# Patient Record
Sex: FEMALE | URBAN - METROPOLITAN AREA
[De-identification: names, ages, dates, MRNs, and addresses within clinical notes are randomized per-mention and may not be internally consistent; named-entity substitution may affect disease eponyms.]

---

## 2023-05-22 ENCOUNTER — APPOINTMENT (RX ONLY)
Dept: URBAN - METROPOLITAN AREA CLINIC 340 | Facility: CLINIC | Age: 73
Setting detail: DERMATOLOGY
End: 2023-05-22

## 2023-05-22 PROBLEM — D03.39 MELANOMA IN SITU OF OTHER PARTS OF FACE: Status: ACTIVE | Noted: 2023-05-22

## 2023-05-22 PROCEDURE — ? OTHER

## 2023-05-22 PROCEDURE — ? CONSULTATION FOR STAGED EXCISION WITH PERMANENT SECTIONS

## 2023-05-22 PROCEDURE — ? COUNSELING

## 2023-05-22 PROCEDURE — ? DEFER

## 2023-05-22 PROCEDURE — 99203 OFFICE O/P NEW LOW 30 MIN: CPT

## 2023-05-22 NOTE — PROCEDURE: DEFER
Other Procedure: slow Mohs
X Size Of Lesion In Cm (Optional): 0
Detail Level: Detailed
Procedure To Be Performed At Next Visit: Mohs surgery
Introduction Text (Please End With A Colon): The following procedure was deferred:

## 2023-05-22 NOTE — PROCEDURE: OTHER
Other (Free Text): No artificial heart valve, or joint replacements. Not a smoker.
Render Risk Assessment In Note?: no
Detail Level: Detailed
Note Text (......Xxx Chief Complaint.): This diagnosis correlates with the

## 2023-06-05 ENCOUNTER — APPOINTMENT (RX ONLY)
Dept: URBAN - METROPOLITAN AREA CLINIC 340 | Facility: CLINIC | Age: 73
Setting detail: DERMATOLOGY
End: 2023-06-05

## 2023-06-05 PROBLEM — D03.39 MELANOMA IN SITU OF OTHER PARTS OF FACE: Status: ACTIVE | Noted: 2023-06-05

## 2023-06-05 PROCEDURE — 11642 EXC F/E/E/N/L MAL+MRG 1.1-2: CPT

## 2023-06-05 PROCEDURE — ? STAGED EXCISION WITH PERMANENT SECTION

## 2023-06-05 NOTE — PROCEDURE: STAGED EXCISION WITH PERMANENT SECTION
Consent/Introductory Paragraph: The rationale for staged excision was explained to the patient and consent was obtained. The risks, benefits and alternatives to therapy were discussed in detail. Specifically, the risks of infection, scarring, bleeding, prolonged wound healing, incomplete removal, allergy to anesthesia, nerve injury and recurrence were addressed. Prior to the procedure, the treatment site was clearly identified and confirmed by the patient.

## 2023-06-05 NOTE — PROCEDURE: STAGED EXCISION WITH PERMANENT SECTION
Previous Accession (Optional): [facility] [id number] Previous Accession (Optional): [facility] [id number]

## 2023-06-05 NOTE — PROCEDURE: STAGED EXCISION WITH PERMANENT SECTION
Staining Text: Specimen #1 was submitted to assess for residual tumor and invasion Specimens #2,3,4, 5 were submitted for en face epidermal sectioning.  Medial edge was stained blue, lateral edge for full en face sectioning was unstained.

## 2023-06-07 ENCOUNTER — APPOINTMENT (RX ONLY)
Dept: URBAN - METROPOLITAN AREA CLINIC 340 | Facility: CLINIC | Age: 73
Setting detail: DERMATOLOGY
End: 2023-06-07

## 2023-06-07 PROBLEM — D03.39 MELANOMA IN SITU OF OTHER PARTS OF FACE: Status: ACTIVE | Noted: 2023-06-07

## 2023-06-07 PROCEDURE — ? REPAIR NOTE

## 2023-06-07 PROCEDURE — 12052 INTMD RPR FACE/MM 2.6-5.0 CM: CPT | Mod: 79

## 2023-06-07 NOTE — PROCEDURE: REPAIR NOTE
yellow fruits and vegetables are especially good for you. Examples include spinach, carrots, peaches, and berries.     · Foods high in fiber can reduce your cholesterol and provide important vitamins and minerals. High-fiber foods include whole-grain cereals and breads, oatmeal, beans, brown rice, citrus fruits, and apples.     · Limit drinks and foods with added sugar. These include candy, desserts, and soda pop.    Lifestyle changes    · If your doctor recommends it, get more exercise. Walking is a good choice. Bit by bit, increase the amount you walk every day. Try for at least 30 minutes on most days of the week. You also may want to swim, bike, or do other activities.     · Do not smoke. If you need help quitting, talk to your doctor about stop-smoking programs and medicines. These can increase your chances of quitting for good. Quitting smoking may be the most important step you can take to protect your heart. It is never too late to quit. You will get health benefits right away.     · Limit alcohol to 2 drinks a day for men and 1 drink a day for women. Too much alcohol can cause health problems. Medicines    · Take your medicines exactly as prescribed. Call your doctor if you think you are having a problem with your medicine.     · If your doctor recommends aspirin, take the amount directed each day. Make sure you take aspirin and not another kind of pain reliever, such as acetaminophen (Tylenol). If you take ibuprofen (such as Advil or Motrin) for other problems, take aspirin at least 2 hours before taking ibuprofen. When should you call for help? Call 911 if you have symptoms of a heart attack.  These may include:    · Chest pain or pressure, or a strange feeling in the chest.     · Sweating.     · Shortness of breath.     · Pain, pressure, or a strange feeling in the back, neck, jaw, or upper belly or in one or both shoulders or arms.     · Lightheadedness or sudden weakness.     · A fast or irregular Interpolation Flap Text: A decision was made to reconstruct the defect utilizing an interpolation axial flap and a staged reconstruction.  A telfa template was made of the defect.  This telfa template was then used to outline the interpolation flap.  The donor area for the pedicle flap was then injected with anesthesia.  The flap was excised through the skin and subcutaneous tissue down to the layer of the underlying musculature.  The interpolation flap was carefully excised within this deep plane to maintain its blood supply.  The edges of the donor site were undermined.   The donor site was closed in a primary fashion.  The pedicle was then rotated into position and sutured.  Once the tube was sutured into place, adequate blood supply was confirmed with blanching and refill.  The pedicle was then wrapped with xeroform gauze and dressed appropriately with a telfa and gauze bandage to ensure continued blood supply and protect the attached pedicle.

## 2023-06-07 NOTE — PROCEDURE: REPAIR NOTE
Consent: The rationale for the repair was explained to the patient and consent was obtained. The risks, benefits and alternatives to therapy were discussed in detail. Specifically, the risks of infection, scarring, bleeding, prolonged wound healing,, allergy to anesthesia, nerve injury were addressed. Prior to the procedure, the treatment site was clearly identified and confirmed by the patient.

## 2023-06-07 NOTE — PROCEDURE: REPAIR NOTE
Graft Donor Site Bandage (Optional-Leave Blank If You Don't Want In Note): Aquaphor and pressure bandage

## 2023-06-07 NOTE — PROCEDURE: REPAIR NOTE
O-Z Flap Text: The defect edges were debeveled with a #15 scalpel blade.  Given the location of the defect, shape of the defect and the proximity to free margins an O-Z flap was deemed most appropriate.  Using a sterile surgical marker, an appropriate transposition flap was drawn incorporating the defect and placing the expected incisions within the relaxed skin tension lines where possible. The area thus outlined was incised deep to adipose tissue with a #15 scalpel blade.  The skin margins were undermined to an appropriate distance in all directions utilizing iris scissors. Other

## 2023-06-09 ENCOUNTER — APPOINTMENT (RX ONLY)
Dept: URBAN - METROPOLITAN AREA CLINIC 340 | Facility: CLINIC | Age: 73
Setting detail: DERMATOLOGY
End: 2023-06-09

## 2023-06-09 ENCOUNTER — APPOINTMENT (RX ONLY)
Dept: URBAN - METROPOLITAN AREA CLINIC 336 | Facility: CLINIC | Age: 73
Setting detail: DERMATOLOGY
End: 2023-06-09

## 2023-06-09 DIAGNOSIS — Z48.817 ENCOUNTER FOR SURGICAL AFTERCARE FOLLOWING SURGERY ON THE SKIN AND SUBCUTANEOUS TISSUE: ICD-10-CM

## 2023-06-09 PROCEDURE — ? POST-OP WOUND CHECK

## 2023-06-09 ASSESSMENT — LOCATION SIMPLE DESCRIPTION DERM: LOCATION SIMPLE: LEFT ZYGOMA

## 2023-06-09 ASSESSMENT — LOCATION ZONE DERM: LOCATION ZONE: FACE

## 2023-06-09 ASSESSMENT — LOCATION DETAILED DESCRIPTION DERM: LOCATION DETAILED: LEFT LATERAL ZYGOMA

## 2023-06-09 NOTE — PROCEDURE: POST-OP WOUND CHECK
Detail Level: Detailed
Add 75757 Cpt? (Important Note: In 2017 The Use Of 06682 Is Being Tracked By Cms To Determine Future Global Period Reimbursement For Global Periods): no
Wound Evaluated By: BF/LB

## 2023-06-14 ENCOUNTER — APPOINTMENT (RX ONLY)
Dept: URBAN - METROPOLITAN AREA CLINIC 340 | Facility: CLINIC | Age: 73
Setting detail: DERMATOLOGY
End: 2023-06-14

## 2023-06-14 DIAGNOSIS — Z48.817 ENCOUNTER FOR SURGICAL AFTERCARE FOLLOWING SURGERY ON THE SKIN AND SUBCUTANEOUS TISSUE: ICD-10-CM

## 2023-06-14 PROCEDURE — 99024 POSTOP FOLLOW-UP VISIT: CPT

## 2023-06-14 PROCEDURE — ? POST-OP WOUND CHECK

## 2023-06-14 ASSESSMENT — LOCATION DETAILED DESCRIPTION DERM: LOCATION DETAILED: LEFT CENTRAL ZYGOMA

## 2023-06-14 ASSESSMENT — LOCATION ZONE DERM: LOCATION ZONE: FACE

## 2023-06-14 ASSESSMENT — LOCATION SIMPLE DESCRIPTION DERM: LOCATION SIMPLE: LEFT ZYGOMA

## 2023-06-14 NOTE — PROCEDURE: POST-OP WOUND CHECK
Detail Level: Detailed
Add 08483 Cpt? (Important Note: In 2017 The Use Of 31181 Is Being Tracked By Cms To Determine Future Global Period Reimbursement For Global Periods): yes
Wound Evaluated By: ANTHONY/LARRY
Additional Comments: Pt counseled to continue ice and that it takes time for eyelid edema to settle down.   Skin medica scar gel 2x daily. Follow w/ SPF in the morning. Massage into area at night.

## 2023-06-21 ENCOUNTER — APPOINTMENT (RX ONLY)
Dept: URBAN - METROPOLITAN AREA CLINIC 340 | Facility: CLINIC | Age: 73
Setting detail: DERMATOLOGY
End: 2023-06-21

## 2023-06-21 DIAGNOSIS — Z48.817 ENCOUNTER FOR SURGICAL AFTERCARE FOLLOWING SURGERY ON THE SKIN AND SUBCUTANEOUS TISSUE: ICD-10-CM

## 2023-06-21 PROCEDURE — ? POST-OP WOUND CHECK (NO GLOBAL PERIOD)

## 2023-06-21 ASSESSMENT — LOCATION SIMPLE DESCRIPTION DERM: LOCATION SIMPLE: LEFT ZYGOMA

## 2023-06-21 ASSESSMENT — LOCATION DETAILED DESCRIPTION DERM: LOCATION DETAILED: LEFT LATERAL ZYGOMA

## 2023-06-21 ASSESSMENT — LOCATION ZONE DERM: LOCATION ZONE: FACE

## 2023-06-21 NOTE — PROCEDURE: POST-OP WOUND CHECK (NO GLOBAL PERIOD)
Detail Level: Detailed
Wound Evaluated By: JAIMIE/LARRY
Additional Comments: Pt is 2 weeks post op - healing well with good cosmesis for time frame.

## 2023-06-28 ENCOUNTER — APPOINTMENT (RX ONLY)
Dept: URBAN - METROPOLITAN AREA CLINIC 340 | Facility: CLINIC | Age: 73
Setting detail: DERMATOLOGY
End: 2023-06-28

## 2023-06-28 DIAGNOSIS — Z48.817 ENCOUNTER FOR SURGICAL AFTERCARE FOLLOWING SURGERY ON THE SKIN AND SUBCUTANEOUS TISSUE: ICD-10-CM | Status: STABLE

## 2023-06-28 PROCEDURE — ? POST-OP WOUND CHECK (NO GLOBAL PERIOD)

## 2023-06-28 ASSESSMENT — LOCATION ZONE DERM: LOCATION ZONE: FACE

## 2023-06-28 ASSESSMENT — LOCATION SIMPLE DESCRIPTION DERM: LOCATION SIMPLE: LEFT ZYGOMA

## 2023-06-28 ASSESSMENT — LOCATION DETAILED DESCRIPTION DERM: LOCATION DETAILED: LEFT LATERAL ZYGOMA

## 2023-06-28 NOTE — PROCEDURE: POST-OP WOUND CHECK (NO GLOBAL PERIOD)
Detail Level: Detailed
Wound Evaluated By: /LARRY
Additional Comments: Pt is 3 weeks post op - healing well.  Patient was extensively counseled and physically shown how to correctly massage the scar with scar gel by LARRY. Patient was also counseled on realistic expectations, and  scar healing time being 6-12 months.patient will be closely followed every 2 weeks for wound checks.

## 2023-07-12 ENCOUNTER — APPOINTMENT (RX ONLY)
Dept: URBAN - METROPOLITAN AREA CLINIC 340 | Facility: CLINIC | Age: 73
Setting detail: DERMATOLOGY
End: 2023-07-12

## 2023-07-12 DIAGNOSIS — Z48.817 ENCOUNTER FOR SURGICAL AFTERCARE FOLLOWING SURGERY ON THE SKIN AND SUBCUTANEOUS TISSUE: ICD-10-CM

## 2023-07-12 PROCEDURE — ? POST-OP WOUND CHECK (NO GLOBAL PERIOD)

## 2023-07-12 ASSESSMENT — LOCATION ZONE DERM: LOCATION ZONE: FACE

## 2023-07-12 ASSESSMENT — LOCATION SIMPLE DESCRIPTION DERM: LOCATION SIMPLE: LEFT ZYGOMA

## 2023-07-12 ASSESSMENT — LOCATION DETAILED DESCRIPTION DERM: LOCATION DETAILED: LEFT CENTRAL ZYGOMA

## 2023-07-12 NOTE — PROCEDURE: POST-OP WOUND CHECK (NO GLOBAL PERIOD)
Detail Level: Detailed
Wound Evaluated By: ANTHONY/LARRY
Additional Comments: Advised Pt to mix La Roche Posay moisturizer with the scar gel and massage at night for 5 minutes.  Progressing well for time frame of surgery.  advised pt again of scar progression.

## 2023-08-09 ENCOUNTER — APPOINTMENT (RX ONLY)
Dept: URBAN - METROPOLITAN AREA CLINIC 340 | Facility: CLINIC | Age: 73
Setting detail: DERMATOLOGY
End: 2023-08-09

## 2023-08-09 DIAGNOSIS — Z48.817 ENCOUNTER FOR SURGICAL AFTERCARE FOLLOWING SURGERY ON THE SKIN AND SUBCUTANEOUS TISSUE: ICD-10-CM

## 2023-08-09 PROCEDURE — ? POST-OP WOUND CHECK (NO GLOBAL PERIOD)

## 2023-08-09 PROCEDURE — ? PHOTO-DOCUMENTATION

## 2023-08-09 ASSESSMENT — LOCATION ZONE DERM: LOCATION ZONE: FACE

## 2023-08-09 ASSESSMENT — LOCATION DETAILED DESCRIPTION DERM: LOCATION DETAILED: LEFT LATERAL ZYGOMA

## 2023-08-09 ASSESSMENT — LOCATION SIMPLE DESCRIPTION DERM: LOCATION SIMPLE: LEFT ZYGOMA

## 2023-08-09 NOTE — PROCEDURE: POST-OP WOUND CHECK (NO GLOBAL PERIOD)
Detail Level: Detailed
Wound Evaluated By: VIET/LARRY
Additional Comments: Improving well given time frame of surgery.  Pt may start with vitamin E gel if preferred.  Pt concerned with depression at end of scar line - discussed we have to give the area time, continue massage and could consider punch removal of the small depressed area if it remains after 1 year. PT DUE FOR FBE IN SEPT

## 2023-09-11 ENCOUNTER — APPOINTMENT (RX ONLY)
Dept: URBAN - METROPOLITAN AREA CLINIC 340 | Facility: CLINIC | Age: 73
Setting detail: DERMATOLOGY
End: 2023-09-11

## 2023-09-11 DIAGNOSIS — Z48.817 ENCOUNTER FOR SURGICAL AFTERCARE FOLLOWING SURGERY ON THE SKIN AND SUBCUTANEOUS TISSUE: ICD-10-CM

## 2023-09-11 PROCEDURE — ? PHOTO-DOCUMENTATION

## 2023-09-11 PROCEDURE — ? POST-OP WOUND CHECK (NO GLOBAL PERIOD)

## 2023-09-11 ASSESSMENT — LOCATION SIMPLE DESCRIPTION DERM: LOCATION SIMPLE: LEFT ZYGOMA

## 2023-09-11 ASSESSMENT — LOCATION DETAILED DESCRIPTION DERM: LOCATION DETAILED: LEFT CENTRAL ZYGOMA

## 2023-09-11 ASSESSMENT — LOCATION ZONE DERM: LOCATION ZONE: FACE

## 2023-09-11 NOTE — PROCEDURE: POST-OP WOUND CHECK (NO GLOBAL PERIOD)
Detail Level: Detailed
Wound Evaluated By: LARRY
Additional Comments: 5 minute daily firm massage. \\nPt may use a regular moisturizer. Okay to use vitamin E oil.  Sun protection reviewed.\\nPt states when she smiles broadly there is pull on the scar L side.  Pt states I said there could be cosmetic correction of this - I reminded pt that we had not discussed that last visit.  We had discussed depression at inferior end of scar which has improved could be addressed cosmetically with punch out of depression.  Advised as scar softens the pull she sees with extreme smile will improve, but may never be fully \"normal\" due to scar from procedure.  Advised there is no cosmetic intervention for this.

## 2023-11-06 ENCOUNTER — APPOINTMENT (RX ONLY)
Dept: URBAN - METROPOLITAN AREA CLINIC 340 | Facility: CLINIC | Age: 73
Setting detail: DERMATOLOGY
End: 2023-11-06

## 2023-11-06 DIAGNOSIS — Z48.817 ENCOUNTER FOR SURGICAL AFTERCARE FOLLOWING SURGERY ON THE SKIN AND SUBCUTANEOUS TISSUE: ICD-10-CM

## 2023-11-06 PROCEDURE — ? PHOTO-DOCUMENTATION

## 2023-11-06 PROCEDURE — ? OTHER

## 2023-11-06 PROCEDURE — ? POST-OP WOUND CHECK (NO GLOBAL PERIOD)

## 2023-11-06 ASSESSMENT — LOCATION ZONE DERM: LOCATION ZONE: FACE

## 2023-11-06 ASSESSMENT — LOCATION SIMPLE DESCRIPTION DERM: LOCATION SIMPLE: LEFT ZYGOMA

## 2023-11-06 ASSESSMENT — LOCATION DETAILED DESCRIPTION DERM: LOCATION DETAILED: LEFT CENTRAL ZYGOMA

## 2023-11-06 NOTE — PROCEDURE: OTHER
Note Text (......Xxx Chief Complaint.): This diagnosis correlates with the
Other (Free Text): Discussed scheduling consult with plastics for autologous fat transfer consideration for depression that pt is concerned about\\nGave Pt information from [employer] on how to schedule legal tinted window application\\nDiscussed scheduling cosmetic consult with [facility] for  small dimpled area from surgical site. Let pt know filler is not covered by insurance for a well healing surgical scar and will be out of pocket.\\nDiscussed pt needs to have q 6 mo FBSE - pt had been seeing derm at [facility] that she liked - recommended pt get scheduled soon as she is due.
Render Risk Assessment In Note?: no
Detail Level: Detailed

## 2024-02-07 ENCOUNTER — APPOINTMENT (RX ONLY)
Dept: URBAN - METROPOLITAN AREA CLINIC 340 | Facility: CLINIC | Age: 74
Setting detail: DERMATOLOGY
End: 2024-02-07

## 2024-02-07 DIAGNOSIS — Z48.817 ENCOUNTER FOR SURGICAL AFTERCARE FOLLOWING SURGERY ON THE SKIN AND SUBCUTANEOUS TISSUE: ICD-10-CM

## 2024-02-07 PROCEDURE — ? POST-OP WOUND CHECK (NO GLOBAL PERIOD)

## 2024-02-07 ASSESSMENT — LOCATION DETAILED DESCRIPTION DERM: LOCATION DETAILED: LEFT CENTRAL ZYGOMA

## 2024-02-07 ASSESSMENT — LOCATION ZONE DERM: LOCATION ZONE: FACE

## 2024-02-07 ASSESSMENT — LOCATION SIMPLE DESCRIPTION DERM: LOCATION SIMPLE: LEFT ZYGOMA

## 2024-02-07 NOTE — PROCEDURE: POST-OP WOUND CHECK (NO GLOBAL PERIOD)
Called patient, unable to reach patient. Left voice message and also sent portal message in regards to rescheduling her appointment on 6/2/22 due to RD out of office.  
Detail Level: Detailed
Wound Evaluated By: LARRY / DEBORAH
Additional Comments: Patient can discontinue scar gel. NER MARIKA.  Pt had FBSE in December at [facility] - recommended every six months

## 2024-05-16 ENCOUNTER — APPOINTMENT (RX ONLY)
Dept: URBAN - METROPOLITAN AREA CLINIC 354 | Facility: CLINIC | Age: 74
Setting detail: DERMATOLOGY
End: 2024-05-16

## 2024-05-16 DIAGNOSIS — L82.1 OTHER SEBORRHEIC KERATOSIS: ICD-10-CM

## 2024-05-16 DIAGNOSIS — D485 NEOPLASM OF UNCERTAIN BEHAVIOR OF SKIN: ICD-10-CM

## 2024-05-16 DIAGNOSIS — L81.4 OTHER MELANIN HYPERPIGMENTATION: ICD-10-CM

## 2024-05-16 DIAGNOSIS — D18.0 HEMANGIOMA: ICD-10-CM

## 2024-05-16 DIAGNOSIS — D22 MELANOCYTIC NEVI: ICD-10-CM

## 2024-05-16 PROBLEM — D18.01 HEMANGIOMA OF SKIN AND SUBCUTANEOUS TISSUE: Status: ACTIVE | Noted: 2024-05-16

## 2024-05-16 PROBLEM — D22.5 MELANOCYTIC NEVI OF TRUNK: Status: ACTIVE | Noted: 2024-05-16

## 2024-05-16 PROBLEM — D48.5 NEOPLASM OF UNCERTAIN BEHAVIOR OF SKIN: Status: ACTIVE | Noted: 2024-05-16

## 2024-05-16 PROCEDURE — 11102 TANGNTL BX SKIN SINGLE LES: CPT

## 2024-05-16 PROCEDURE — ? COUNSELING

## 2024-05-16 PROCEDURE — 11103 TANGNTL BX SKIN EA SEP/ADDL: CPT

## 2024-05-16 PROCEDURE — ? SUNSCREEN RECOMMENDATIONS

## 2024-05-16 PROCEDURE — ? OBSERVATION AND MEASURE

## 2024-05-16 PROCEDURE — ? BIOPSY BY SHAVE METHOD

## 2024-05-16 PROCEDURE — 99213 OFFICE O/P EST LOW 20 MIN: CPT | Mod: 25

## 2024-05-16 ASSESSMENT — LOCATION SIMPLE DESCRIPTION DERM
LOCATION SIMPLE: LEFT THIGH
LOCATION SIMPLE: LEFT UPPER ARM
LOCATION SIMPLE: ABDOMEN
LOCATION SIMPLE: LEFT CHEEK

## 2024-05-16 ASSESSMENT — LOCATION DETAILED DESCRIPTION DERM
LOCATION DETAILED: PERIUMBILICAL SKIN
LOCATION DETAILED: EPIGASTRIC SKIN
LOCATION DETAILED: LEFT LATERAL PROXIMAL UPPER ARM
LOCATION DETAILED: LEFT CENTRAL MALAR CHEEK
LOCATION DETAILED: LEFT ANTERIOR LATERAL PROXIMAL THIGH

## 2024-05-16 ASSESSMENT — LOCATION ZONE DERM
LOCATION ZONE: ARM
LOCATION ZONE: TRUNK
LOCATION ZONE: LEG
LOCATION ZONE: FACE

## 2024-05-16 NOTE — PROCEDURE: SUNSCREEN RECOMMENDATIONS
General Sunscreen Counseling: I recommended a broad spectrum sunscreen with a SPF of 30 or higher.  I explained that SPF 30 sunscreens block approximately 97 percent of the sun's harmful rays.  Sunscreens should be applied at least 15 minutes prior to expected sun exposure and then every 2 hours after that as long as sun exposure continues. If swimming or exercising sunscreen should be reapplied every 45 minutes to an hour after getting wet or sweating.  One ounce, or the equivalent of a shot glass full of sunscreen, is adequate to protect the skin not covered by a bathing suit. I also recommended a lip balm with a sunscreen as well. Sun protective clothing can be used in lieu of sunscreen but must be worn the entire time you are exposed to the sun's rays.
Detail Level: Generalized
Products Recommended: I recommend the following sunscreens:\\nElta MD UV Physical Broad Spectrum SPF 41, Neutrogena Zinc Sheer, La Roche Posay Antihelios Facial Sunscreen SPF 50 or Vanicream Sensitive Skin Susncreen.

## 2024-05-16 NOTE — PROCEDURE: BIOPSY BY SHAVE METHOD
Detail Level: Detailed
Depth Of Biopsy: dermis
Was A Bandage Applied: Yes
Size Of Lesion In Cm: 0
Biopsy Type: H and E
Biopsy Method: 15 blade
Anesthesia Type: 1% lidocaine with epinephrine
Anesthesia Volume In Cc: 0.5
Hemostasis: Electrocautery and Aluminum Chloride
Wound Care: Aquaphor
Dressing: bandage
Destruction After The Procedure: No
Type Of Destruction Used: Curettage
Curettage Text: The wound bed was treated with curettage after the biopsy was performed.
Cryotherapy Text: The wound bed was treated with cryotherapy after the biopsy was performed.
Electrodesiccation Text: The wound bed was treated with electrodesiccation after the biopsy was performed.
Electrodesiccation And Curettage Text: The wound bed was treated with electrodesiccation and curettage after the biopsy was performed.
Silver Nitrate Text: The wound bed was treated with silver nitrate after the biopsy was performed.
Lab: 6
Lab Facility: [id number]
Consent: VERBAL consent was obtained and risks were reviewed including but not limited to scarring, infection, bleeding, scabbing, incomplete removal, nerve damage and allergy to anesthesia.
Post-Care Instructions: I reviewed with the patient in detail post-care instructions. Patient is to keep the biopsy site dry overnight, and then apply bacitracin twice daily until healed. Patient may apply hydrogen peroxide soaks to remove any crusting.
Notification Instructions: Patient to return to clinic in 2 weeks for results.
Billing Type: Third-Party Bill
Information: Selecting Yes will display possible errors in your note based on the variables you have selected. This validation is only offered as a suggestion for you. PLEASE NOTE THAT THE VALIDATION TEXT WILL BE REMOVED WHEN YOU FINALIZE YOUR NOTE. IF YOU WANT TO FAX A PRELIMINARY NOTE YOU WILL NEED TO TOGGLE THIS TO 'NO' IF YOU DO NOT WANT IT IN YOUR FAXED NOTE.

## 2024-06-27 ENCOUNTER — APPOINTMENT (RX ONLY)
Dept: URBAN - METROPOLITAN AREA CLINIC 354 | Facility: CLINIC | Age: 74
Setting detail: DERMATOLOGY
End: 2024-06-27

## 2024-06-27 DIAGNOSIS — L90.5 SCAR CONDITIONS AND FIBROSIS OF SKIN: ICD-10-CM | Status: STABLE

## 2024-06-27 PROCEDURE — ? NO CHARGE VISIT

## 2024-06-27 PROCEDURE — ? OTHER

## 2024-08-07 ENCOUNTER — APPOINTMENT (RX ONLY)
Dept: URBAN - METROPOLITAN AREA CLINIC 340 | Facility: CLINIC | Age: 74
Setting detail: DERMATOLOGY
End: 2024-08-07

## 2024-08-07 DIAGNOSIS — L82.1 OTHER SEBORRHEIC KERATOSIS: ICD-10-CM | Status: STABLE

## 2024-08-07 DIAGNOSIS — Z86.006 PERSONAL HISTORY OF MELANOMA IN-SITU: ICD-10-CM

## 2024-08-07 PROCEDURE — ? COUNSELING

## 2024-08-07 PROCEDURE — ? OTHER

## 2024-08-07 PROCEDURE — 99212 OFFICE O/P EST SF 10 MIN: CPT

## 2024-08-07 ASSESSMENT — LOCATION SIMPLE DESCRIPTION DERM
LOCATION SIMPLE: LEFT ZYGOMA
LOCATION SIMPLE: RIGHT FOREHEAD

## 2024-08-07 ASSESSMENT — LOCATION DETAILED DESCRIPTION DERM
LOCATION DETAILED: LEFT CENTRAL ZYGOMA
LOCATION DETAILED: RIGHT SUPERIOR FOREHEAD

## 2024-08-07 ASSESSMENT — LOCATION ZONE DERM: LOCATION ZONE: FACE

## 2024-08-07 NOTE — PROCEDURE: OTHER
Note Text (......Xxx Chief Complaint.): This diagnosis correlates with the
Detail Level: Zone
Other (Free Text): Left central zygoma -  s/p slow MOHS by LARRY 6/5/2023 [facility], NER - counseled scar evolution is nearly complete\\nPt does FBSE Dr. Woody- recommended q 6 month FBE
Render Risk Assessment In Note?: no

## 2024-11-14 ENCOUNTER — APPOINTMENT (RX ONLY)
Dept: URBAN - METROPOLITAN AREA CLINIC 354 | Facility: CLINIC | Age: 74
Setting detail: DERMATOLOGY
End: 2024-11-14

## 2024-11-14 DIAGNOSIS — L82.1 OTHER SEBORRHEIC KERATOSIS: ICD-10-CM

## 2024-11-14 DIAGNOSIS — D22 MELANOCYTIC NEVI: ICD-10-CM

## 2024-11-14 DIAGNOSIS — L85.3 XEROSIS CUTIS: ICD-10-CM | Status: INADEQUATELY CONTROLLED

## 2024-11-14 DIAGNOSIS — Z86.006 PERSONAL HISTORY OF MELANOMA IN-SITU: ICD-10-CM

## 2024-11-14 DIAGNOSIS — L65.9 NONSCARRING HAIR LOSS, UNSPECIFIED: ICD-10-CM

## 2024-11-14 DIAGNOSIS — D18.0 HEMANGIOMA: ICD-10-CM

## 2024-11-14 DIAGNOSIS — L81.4 OTHER MELANIN HYPERPIGMENTATION: ICD-10-CM

## 2024-11-14 PROBLEM — D22.5 MELANOCYTIC NEVI OF TRUNK: Status: ACTIVE | Noted: 2024-11-14

## 2024-11-14 PROBLEM — D18.01 HEMANGIOMA OF SKIN AND SUBCUTANEOUS TISSUE: Status: ACTIVE | Noted: 2024-11-14

## 2024-11-14 PROCEDURE — ? OTC TREATMENT REGIMEN

## 2024-11-14 PROCEDURE — ? COUNSELING

## 2024-11-14 PROCEDURE — ? PRESCRIPTION MEDICATION MANAGEMENT

## 2024-11-14 PROCEDURE — ? SUNSCREEN RECOMMENDATIONS

## 2024-11-14 PROCEDURE — ? OTHER

## 2024-11-14 PROCEDURE — 99213 OFFICE O/P EST LOW 20 MIN: CPT

## 2024-11-14 PROCEDURE — ? PRESCRIPTION

## 2024-11-14 RX ORDER — EMOLLIENT COMBINATION NO.32
EMULSION, EXTENDED RELEASE TOPICAL
Qty: 90 | Refills: 4 | Status: ERX | COMMUNITY
Start: 2024-11-14

## 2024-11-14 RX ADMIN — Medication 1: at 00:00

## 2024-11-14 ASSESSMENT — LOCATION DETAILED DESCRIPTION DERM
LOCATION DETAILED: PERIUMBILICAL SKIN
LOCATION DETAILED: INFERIOR THORACIC SPINE
LOCATION DETAILED: EPIGASTRIC SKIN
LOCATION DETAILED: LEFT CENTRAL ZYGOMA
LOCATION DETAILED: MID-FRONTAL SCALP

## 2024-11-14 ASSESSMENT — LOCATION ZONE DERM
LOCATION ZONE: FACE
LOCATION ZONE: SCALP
LOCATION ZONE: TRUNK

## 2024-11-14 ASSESSMENT — LOCATION SIMPLE DESCRIPTION DERM
LOCATION SIMPLE: ABDOMEN
LOCATION SIMPLE: ANTERIOR SCALP
LOCATION SIMPLE: LEFT ZYGOMA
LOCATION SIMPLE: UPPER BACK

## 2024-11-14 NOTE — PROCEDURE: PRESCRIPTION MEDICATION MANAGEMENT
Samples Given: Epicerum
Detail Level: Zone
Render In Strict Bullet Format?: No
Initiate Treatment: EpiCeram topical emulsion, extended release: Apply to the affected dry areas of the body daily.

## 2024-11-14 NOTE — PROCEDURE: OTC TREATMENT REGIMEN
Detail Level: Zone
Patient Specific Otc Recommendations (Will Not Stick From Patient To Patient): Nioxin shampoo

## 2025-05-23 ENCOUNTER — APPOINTMENT (OUTPATIENT)
Dept: URBAN - METROPOLITAN AREA CLINIC 340 | Facility: CLINIC | Age: 75
Setting detail: DERMATOLOGY
End: 2025-05-23

## 2025-05-23 DIAGNOSIS — L90.8 OTHER ATROPHIC DISORDERS OF SKIN: ICD-10-CM | Status: INADEQUATELY CONTROLLED

## 2025-05-23 DIAGNOSIS — D485 NEOPLASM OF UNCERTAIN BEHAVIOR OF SKIN: ICD-10-CM | Status: INADEQUATELY CONTROLLED

## 2025-05-23 DIAGNOSIS — Z86.006 PERSONAL HISTORY OF MELANOMA IN-SITU: ICD-10-CM

## 2025-05-23 DIAGNOSIS — L82.1 OTHER SEBORRHEIC KERATOSIS: ICD-10-CM

## 2025-05-23 DIAGNOSIS — D18.0 HEMANGIOMA: ICD-10-CM

## 2025-05-23 DIAGNOSIS — L81.4 OTHER MELANIN HYPERPIGMENTATION: ICD-10-CM

## 2025-05-23 DIAGNOSIS — D22 MELANOCYTIC NEVI: ICD-10-CM

## 2025-05-23 DIAGNOSIS — L57.0 ACTINIC KERATOSIS: ICD-10-CM | Status: INADEQUATELY CONTROLLED

## 2025-05-23 PROBLEM — D48.5 NEOPLASM OF UNCERTAIN BEHAVIOR OF SKIN: Status: ACTIVE | Noted: 2025-05-23

## 2025-05-23 PROBLEM — D18.01 HEMANGIOMA OF SKIN AND SUBCUTANEOUS TISSUE: Status: ACTIVE | Noted: 2025-05-23

## 2025-05-23 PROBLEM — D22.5 MELANOCYTIC NEVI OF TRUNK: Status: ACTIVE | Noted: 2025-05-23

## 2025-05-23 PROCEDURE — ? FULL BODY SKIN EXAM

## 2025-05-23 PROCEDURE — 99213 OFFICE O/P EST LOW 20 MIN: CPT | Mod: 25

## 2025-05-23 PROCEDURE — ? BIOPSY BY SHAVE METHOD

## 2025-05-23 PROCEDURE — ? PRESCRIPTION MEDICATION MANAGEMENT

## 2025-05-23 PROCEDURE — ? OTC TREATMENT REGIMEN

## 2025-05-23 PROCEDURE — ? COUNSELING

## 2025-05-23 PROCEDURE — ? PRESCRIPTION

## 2025-05-23 PROCEDURE — ? OTHER

## 2025-05-23 PROCEDURE — 11102 TANGNTL BX SKIN SINGLE LES: CPT

## 2025-05-23 PROCEDURE — ? DEFER

## 2025-05-23 RX ORDER — TRETIONIN 0.25 MG/G
CREAM TOPICAL
Qty: 45 | Refills: 3 | COMMUNITY
Start: 2025-05-23

## 2025-05-23 RX ADMIN — TRETIONIN: 0.25 CREAM TOPICAL at 00:00

## 2025-05-23 ASSESSMENT — LOCATION DETAILED DESCRIPTION DERM
LOCATION DETAILED: LEFT MEDIAL MALAR CHEEK
LOCATION DETAILED: RIGHT INFERIOR CENTRAL MALAR CHEEK
LOCATION DETAILED: PERIUMBILICAL SKIN
LOCATION DETAILED: LEFT INFERIOR CENTRAL MALAR CHEEK
LOCATION DETAILED: LEFT CENTRAL ZYGOMA
LOCATION DETAILED: SUBXIPHOID
LOCATION DETAILED: EPIGASTRIC SKIN

## 2025-05-23 ASSESSMENT — LOCATION ZONE DERM
LOCATION ZONE: TRUNK
LOCATION ZONE: FACE

## 2025-05-23 ASSESSMENT — TOTAL NUMBER OF LESIONS: # OF LESIONS?: 1

## 2025-05-23 ASSESSMENT — PAIN INTENSITY VAS: HOW INTENSE IS YOUR PAIN 0 BEING NO PAIN, 10 BEING THE MOST SEVERE PAIN POSSIBLE?: NO PAIN

## 2025-05-23 ASSESSMENT — LOCATION SIMPLE DESCRIPTION DERM
LOCATION SIMPLE: LEFT ZYGOMA
LOCATION SIMPLE: RIGHT CHEEK
LOCATION SIMPLE: ABDOMEN
LOCATION SIMPLE: LEFT CHEEK

## 2025-05-23 NOTE — PROCEDURE: PRESCRIPTION MEDICATION MANAGEMENT
Detail Level: Zone
Initiate Treatment: tretinoin 0.025 % topical cream: Apply a pea-sized amount to the entire face once nightly after cleansing. Follow with moisturizer.
Render In Strict Bullet Format?: No

## 2025-06-13 ENCOUNTER — APPOINTMENT (OUTPATIENT)
Dept: URBAN - METROPOLITAN AREA CLINIC 340 | Facility: CLINIC | Age: 75
Setting detail: DERMATOLOGY
End: 2025-06-13

## 2025-06-13 DIAGNOSIS — L82.1 OTHER SEBORRHEIC KERATOSIS: ICD-10-CM | Status: STABLE

## 2025-06-13 DIAGNOSIS — L85.3 XEROSIS CUTIS: ICD-10-CM | Status: INADEQUATELY CONTROLLED

## 2025-06-13 DIAGNOSIS — D22 MELANOCYTIC NEVI: ICD-10-CM | Status: RESOLVED

## 2025-06-13 PROBLEM — D22.39 MELANOCYTIC NEVI OF OTHER PARTS OF FACE: Status: ACTIVE | Noted: 2025-06-13

## 2025-06-13 PROCEDURE — ? PATHOLOGY DISCUSSION

## 2025-06-13 PROCEDURE — ? COUNSELING

## 2025-06-13 PROCEDURE — ? OTC TREATMENT REGIMEN

## 2025-06-13 ASSESSMENT — LOCATION ZONE DERM
LOCATION ZONE: ARM
LOCATION ZONE: LEG
LOCATION ZONE: FACE

## 2025-06-13 ASSESSMENT — LOCATION SIMPLE DESCRIPTION DERM
LOCATION SIMPLE: LEFT ZYGOMA
LOCATION SIMPLE: LEFT PRETIBIAL REGION
LOCATION SIMPLE: RIGHT FOREARM

## 2025-06-13 ASSESSMENT — LOCATION DETAILED DESCRIPTION DERM
LOCATION DETAILED: RIGHT PROXIMAL RADIAL DORSAL FOREARM
LOCATION DETAILED: LEFT CENTRAL ZYGOMA
LOCATION DETAILED: LEFT PROXIMAL PRETIBIAL REGION

## 2025-06-13 NOTE — PROCEDURE: OTC TREATMENT REGIMEN
Patient Specific Otc Recommendations (Will Not Stick From Patient To Patient): Recommended CereVe rough and bumpy moisturizer or Amlactin
Detail Level: Zone